# Patient Record
Sex: FEMALE | Race: AMERICAN INDIAN OR ALASKA NATIVE | NOT HISPANIC OR LATINO | Employment: PART TIME | URBAN - METROPOLITAN AREA
[De-identification: names, ages, dates, MRNs, and addresses within clinical notes are randomized per-mention and may not be internally consistent; named-entity substitution may affect disease eponyms.]

---

## 2017-06-02 ENCOUNTER — HOSPITAL ENCOUNTER (OUTPATIENT)
Dept: RADIOLOGY | Facility: MEDICAL CENTER | Age: 52
End: 2017-06-02
Attending: NURSE PRACTITIONER
Payer: COMMERCIAL

## 2017-06-02 DIAGNOSIS — Z12.31 ENCOUNTER FOR MAMMOGRAM TO ESTABLISH BASELINE MAMMOGRAM: ICD-10-CM

## 2017-06-02 PROCEDURE — 77063 BREAST TOMOSYNTHESIS BI: CPT

## 2018-01-16 ENCOUNTER — HOSPITAL ENCOUNTER (OUTPATIENT)
Dept: RADIOLOGY | Facility: MEDICAL CENTER | Age: 53
End: 2018-01-16
Attending: NURSE PRACTITIONER
Payer: COMMERCIAL

## 2018-01-16 DIAGNOSIS — R52 PAIN: ICD-10-CM

## 2018-01-16 PROCEDURE — 72100 X-RAY EXAM L-S SPINE 2/3 VWS: CPT

## 2018-04-24 ENCOUNTER — HOSPITAL ENCOUNTER (OUTPATIENT)
Dept: RADIOLOGY | Facility: MEDICAL CENTER | Age: 53
End: 2018-04-24
Attending: NURSE PRACTITIONER
Payer: COMMERCIAL

## 2018-04-24 DIAGNOSIS — M54.2 CERVICALGIA: ICD-10-CM

## 2018-04-24 PROCEDURE — 72050 X-RAY EXAM NECK SPINE 4/5VWS: CPT

## 2018-07-12 ENCOUNTER — OFFICE VISIT (OUTPATIENT)
Dept: URGENT CARE | Facility: CLINIC | Age: 53
End: 2018-07-12
Payer: COMMERCIAL

## 2018-07-12 VITALS
WEIGHT: 155 LBS | HEIGHT: 64 IN | SYSTOLIC BLOOD PRESSURE: 108 MMHG | RESPIRATION RATE: 16 BRPM | OXYGEN SATURATION: 94 % | DIASTOLIC BLOOD PRESSURE: 64 MMHG | BODY MASS INDEX: 26.46 KG/M2 | HEART RATE: 65 BPM | TEMPERATURE: 96.9 F

## 2018-07-12 DIAGNOSIS — H81.11 BENIGN PAROXYSMAL POSITIONAL VERTIGO OF RIGHT EAR: ICD-10-CM

## 2018-07-12 DIAGNOSIS — J01.80 OTHER ACUTE SINUSITIS, RECURRENCE NOT SPECIFIED: ICD-10-CM

## 2018-07-12 DIAGNOSIS — H69.91 DYSFUNCTION OF RIGHT EUSTACHIAN TUBE: ICD-10-CM

## 2018-07-12 PROCEDURE — 99203 OFFICE O/P NEW LOW 30 MIN: CPT | Performed by: FAMILY MEDICINE

## 2018-07-12 RX ORDER — AMOXICILLIN AND CLAVULANATE POTASSIUM 875; 125 MG/1; MG/1
1 TABLET, FILM COATED ORAL 2 TIMES DAILY
Qty: 20 TAB | Refills: 0 | Status: SHIPPED | OUTPATIENT
Start: 2018-07-12 | End: 2018-07-22

## 2018-07-12 RX ORDER — MECLIZINE HYDROCHLORIDE 25 MG/1
25 TABLET ORAL 3 TIMES DAILY PRN
Qty: 15 TAB | Refills: 0 | Status: SHIPPED | OUTPATIENT
Start: 2018-07-12 | End: 2020-03-18

## 2018-07-12 ASSESSMENT — ENCOUNTER SYMPTOMS
LOSS OF CONSCIOUSNESS: 0
SORE THROAT: 0
BLURRED VISION: 0
SEIZURES: 0
PALPITATIONS: 0
TINGLING: 0
DIZZINESS: 1
CHILLS: 0
SENSORY CHANGE: 0
TREMORS: 0
FEVER: 0
PHOTOPHOBIA: 0
DOUBLE VISION: 0
HEADACHES: 0
FOCAL WEAKNESS: 0
SPEECH CHANGE: 0

## 2018-07-12 ASSESSMENT — PAIN SCALES - GENERAL: PAINLEVEL: NO PAIN

## 2018-07-12 NOTE — LETTER
July 12, 2018         Patient: Chantel Adamson   YOB: 1965   Date of Visit: 7/12/2018           To Whom it May Concern:    Chantel Adamson was seen in my clinic on 7/12/2018. Please excuse patient from work due to illness 7/13-7/14/18.    If you have any questions or concerns, please don't hesitate to call.        Sincerely,           Kaur Hamilton D.O.  Electronically Signed

## 2018-07-13 NOTE — PROGRESS NOTES
Subjective:      Chantel Adamson is a 52 y.o. female who presents with Dizziness (x 3 days )  Patient presents to urgent care with acute onset of a new problem.  Over the past 3 days she has felt dizzy with positional movements.  Started on Monday morning when she got out of bed at 5 AM to go use the restroom she felt very dizzy.  She stayed in bed until 5 PM that day.  She states her symptoms have been mildly improving but not read yet resolved.  She denies any head injury.  [place].  She denies any ear pain no fevers or chills no nausea or vomiting no visual disturbances.  She denies any chest pain or shortness of breath.  She is otherwise healthy.  She denies any difficulty speaking or thought process.  She denies feeling lightheaded or faint.    HPI  Review of Systems   Constitutional: Negative for chills, fever and malaise/fatigue.   HENT: Negative for ear discharge, ear pain, hearing loss, sore throat and tinnitus.    Eyes: Negative for blurred vision, double vision and photophobia.   Cardiovascular: Negative for chest pain and palpitations.   Skin: Negative for itching and rash.   Neurological: Positive for dizziness. Negative for tingling, tremors, sensory change, speech change, focal weakness, seizures, loss of consciousness and headaches.     PMH:  has no past medical history on file.  MEDS:   Current Outpatient Prescriptions:   •  amoxicillin-clavulanate (AUGMENTIN) 875-125 MG Tab, Take 1 Tab by mouth 2 times a day for 10 days. With food, Disp: 20 Tab, Rfl: 0  •  meclizine (ANTIVERT) 25 MG Tab, Take 1 Tab by mouth 3 times a day as needed for Vertigo. May cause sedation, Disp: 15 Tab, Rfl: 0  ALLERGIES: Not on File  SURGHX: No past surgical history on file.  SOCHX:  reports that she has never smoked. She has never used smokeless tobacco. She reports that she drinks alcohol.  FH: Family history was reviewed, no pertinent findings to  "report     Objective:     /64   Pulse 65   Temp 36.1 °C (96.9 °F)   Resp 16   Ht 1.626 m (5' 4\")   Wt 70.3 kg (155 lb)   SpO2 94%   BMI 26.61 kg/m²      Physical Exam   Constitutional: She is oriented to person, place, and time. She appears well-developed and well-nourished. No distress.   HENT:   Right tm with erythema bulge and loss of landmarks  Left tm with mild fluid, mild maxillary sinus ttp   Eyes: Conjunctivae and EOM are normal. Pupils are equal, round, and reactive to light.   Neck: Normal range of motion.   Cardiovascular: Normal rate, regular rhythm, normal heart sounds and intact distal pulses.  Exam reveals no gallop and no friction rub.    No murmur heard.  Pulmonary/Chest: Effort normal and breath sounds normal. No respiratory distress. She has no wheezes. She has no rales. She exhibits no tenderness.   Musculoskeletal: Normal range of motion.   Neurological: She is alert and oriented to person, place, and time.   Nystagmus is present with fast component to the left dizzy symptoms are reproduced with looking left and right as well as head positional changes   Skin: Skin is warm and dry. She is not diaphoretic.   Psychiatric: She has a normal mood and affect. Her behavior is normal. Judgment and thought content normal.            Assessment/Plan:     1. Benign paroxysmal positional vertigo of right ear  meclizine (ANTIVERT) 25 MG Tab   2. Dysfunction of right eustachian tube  amoxicillin-clavulanate (AUGMENTIN) 875-125 MG Tab   3. Other acute sinusitis, recurrence not specified  amoxicillin-clavulanate (AUGMENTIN) 875-125 MG Tab     Differential diagnosis, natural history, supportive care discussed. Follow-up with primary care provider within 7-10 days, emergency room precautions discussed.  Patient and/or family appears understanding of information.    "

## 2020-01-02 ENCOUNTER — OFFICE VISIT (OUTPATIENT)
Dept: URGENT CARE | Facility: CLINIC | Age: 55
End: 2020-01-02
Payer: COMMERCIAL

## 2020-01-02 VITALS
OXYGEN SATURATION: 95 % | WEIGHT: 157 LBS | HEIGHT: 64 IN | DIASTOLIC BLOOD PRESSURE: 74 MMHG | SYSTOLIC BLOOD PRESSURE: 120 MMHG | RESPIRATION RATE: 14 BRPM | TEMPERATURE: 98.1 F | BODY MASS INDEX: 26.8 KG/M2 | HEART RATE: 64 BPM

## 2020-01-02 DIAGNOSIS — N30.00 ACUTE CYSTITIS WITHOUT HEMATURIA: ICD-10-CM

## 2020-01-02 LAB
APPEARANCE UR: CLEAR
BILIRUB UR STRIP-MCNC: NEGATIVE MG/DL
COLOR UR AUTO: YELLOW
GLUCOSE UR STRIP.AUTO-MCNC: NEGATIVE MG/DL
KETONES UR STRIP.AUTO-MCNC: NEGATIVE MG/DL
LEUKOCYTE ESTERASE UR QL STRIP.AUTO: NORMAL
NITRITE UR QL STRIP.AUTO: NEGATIVE
PH UR STRIP.AUTO: 6.5 [PH] (ref 5–8)
PROT UR QL STRIP: NEGATIVE MG/DL
RBC UR QL AUTO: NEGATIVE
SP GR UR STRIP.AUTO: 1.01
UROBILINOGEN UR STRIP-MCNC: 0.2 MG/DL

## 2020-01-02 PROCEDURE — 99214 OFFICE O/P EST MOD 30 MIN: CPT | Performed by: FAMILY MEDICINE

## 2020-01-02 PROCEDURE — 81002 URINALYSIS NONAUTO W/O SCOPE: CPT | Performed by: FAMILY MEDICINE

## 2020-01-02 RX ORDER — CIPROFLOXACIN 500 MG/1
500 TABLET, FILM COATED ORAL EVERY 12 HOURS
Qty: 14 TAB | Refills: 0 | Status: SHIPPED | OUTPATIENT
Start: 2020-01-02 | End: 2020-01-09

## 2020-01-02 NOTE — LETTER
January 2, 2020         Patient: Chantel Adamson   YOB: 1965   Date of Visit: 1/2/2020           To Whom it May Concern:    Chantel Adamson was seen in my clinic on 1/2/2020. She will return to work on 1/4/2020.    If you have any questions or concerns, please don't hesitate to call.        Sincerely,           Timmy Alcala M.D.  Electronically Signed

## 2020-01-03 NOTE — PROGRESS NOTES
"female who presents with DYSURIA, BACK PAIN            DYSURIA    This is a new problem. The current episode started in the past 2 days. The problem occurs constantly. The problem has been unchanged. Associated symptoms include increased urinary frequency and bilat flank pain. Pertinent negatives include no change in bowel habit, chest pain, chills, visual change, vomiting or weakness. Nothing aggravates the symptoms. She has tried nothing for the symptoms.               Social History     Tobacco Use   • Smoking status: Never Smoker   • Smokeless tobacco: Never Used   Substance Use Topics   • Alcohol use: Yes   • Drug use: Not on file       Past medical history was unremarkable and not pertinent to current issue    Current Outpatient Medications on File Prior to Visit   Medication Sig Dispense Refill   • meclizine (ANTIVERT) 25 MG Tab Take 1 Tab by mouth 3 times a day as needed for Vertigo. May cause sedation 15 Tab 0     No current facility-administered medications on file prior to visit.        Review of Systems   Constitutional: Negative for fevers or chills.   HENT: negative for cough, congestion, sore throat  Cardiovascular: Negative for chest pain.   Gastrointestinal: Positive for nausea. Negative for vomiting and change in bowel habit.   Neurological: Negative for weakness, dizziness.          Objective:   /74 (BP Location: Left arm, Patient Position: Sitting, BP Cuff Size: Adult)   Pulse 64   Temp 36.7 °C (98.1 °F) (Temporal)   Resp 14   Ht 1.626 m (5' 4\")   Wt 71.2 kg (157 lb)   SpO2 95%       Physical Exam  HEENT - PERRLA, EOMI  Neuro - alert and oriented x3. CN 2-12 grossly intact.  Lungs - CTA. No wheezes, rhonchi or rales.  Heart - regular rate and rhythm without murmur.  Abdomen - soft and non-tender, bowel sounds active x4.   + bilat flank  pain  Musculoskeletal - No lower extremity edema noted.         No results found for: POCCOLOR, POCAPPEAR, POCLEUKEST, POCNITRITE, POCUROBILIGE, " POCPROTEIN, POCURPH, POCBLOOD, POCSPGRV, POCKETONES, POCBILIRUBIN, POCGLUCUA        Assessment/Plan:     1. Acute cystitis without hematuria  UA c/w infection    - ciprofloxacin (CIPRO) 500 MG Tab; Take 1 Tab by mouth every 12 hours for 7 days.  Dispense: 14 Tab; Refill: 0    Follow up in one week if no improvement, sooner if symptoms worsen.

## 2020-01-15 ENCOUNTER — HOSPITAL ENCOUNTER (EMERGENCY)
Facility: MEDICAL CENTER | Age: 55
End: 2020-01-15
Attending: EMERGENCY MEDICINE
Payer: COMMERCIAL

## 2020-01-15 ENCOUNTER — APPOINTMENT (OUTPATIENT)
Dept: RADIOLOGY | Facility: MEDICAL CENTER | Age: 55
End: 2020-01-15
Attending: EMERGENCY MEDICINE
Payer: COMMERCIAL

## 2020-01-15 VITALS
OXYGEN SATURATION: 94 % | DIASTOLIC BLOOD PRESSURE: 81 MMHG | RESPIRATION RATE: 16 BRPM | SYSTOLIC BLOOD PRESSURE: 133 MMHG | BODY MASS INDEX: 26.84 KG/M2 | HEIGHT: 64 IN | WEIGHT: 157.19 LBS | TEMPERATURE: 97.5 F | HEART RATE: 69 BPM

## 2020-01-15 DIAGNOSIS — R51.9 ACUTE NONINTRACTABLE HEADACHE, UNSPECIFIED HEADACHE TYPE: ICD-10-CM

## 2020-01-15 LAB
FLUAV RNA SPEC QL NAA+PROBE: NEGATIVE
FLUBV RNA SPEC QL NAA+PROBE: NEGATIVE

## 2020-01-15 PROCEDURE — 99283 EMERGENCY DEPT VISIT LOW MDM: CPT

## 2020-01-15 PROCEDURE — 700111 HCHG RX REV CODE 636 W/ 250 OVERRIDE (IP): Performed by: EMERGENCY MEDICINE

## 2020-01-15 PROCEDURE — 70450 CT HEAD/BRAIN W/O DYE: CPT

## 2020-01-15 PROCEDURE — 96372 THER/PROPH/DIAG INJ SC/IM: CPT

## 2020-01-15 PROCEDURE — 87502 INFLUENZA DNA AMP PROBE: CPT

## 2020-01-15 RX ORDER — BUTALBITAL, ACETAMINOPHEN AND CAFFEINE 50; 325; 40 MG/1; MG/1; MG/1
1 TABLET ORAL EVERY 4 HOURS PRN
Qty: 10 TAB | Refills: 0 | Status: SHIPPED | OUTPATIENT
Start: 2020-01-15 | End: 2020-01-18

## 2020-01-15 RX ORDER — CHLORAL HYDRATE 500 MG
1000 CAPSULE ORAL
COMMUNITY

## 2020-01-15 RX ORDER — DIAZEPAM 5 MG/ML
5 INJECTION, SOLUTION INTRAMUSCULAR; INTRAVENOUS ONCE
Status: COMPLETED | OUTPATIENT
Start: 2020-01-15 | End: 2020-01-15

## 2020-01-15 RX ORDER — IBUPROFEN 600 MG/1
600 TABLET ORAL EVERY 6 HOURS PRN
Qty: 20 TAB | Refills: 0 | Status: SHIPPED | OUTPATIENT
Start: 2020-01-15 | End: 2024-02-16

## 2020-01-15 RX ORDER — KETOROLAC TROMETHAMINE 30 MG/ML
30 INJECTION, SOLUTION INTRAMUSCULAR; INTRAVENOUS ONCE
Status: COMPLETED | OUTPATIENT
Start: 2020-01-15 | End: 2020-01-15

## 2020-01-15 RX ORDER — DIAZEPAM 5 MG/1
5 TABLET ORAL EVERY 6 HOURS PRN
Qty: 10 TAB | Refills: 0 | Status: SHIPPED | OUTPATIENT
Start: 2020-01-15 | End: 2020-01-18

## 2020-01-15 RX ADMIN — KETOROLAC TROMETHAMINE 30 MG: 30 INJECTION, SOLUTION INTRAMUSCULAR at 09:15

## 2020-01-15 RX ADMIN — DIAZEPAM 5 MG: 5 INJECTION, SOLUTION INTRAMUSCULAR; INTRAVENOUS at 09:15

## 2020-01-15 NOTE — ED TRIAGE NOTES
"Chief Complaint   Patient presents with   • Headache     Pt c/o pain posterior neck radiating up to the back of her head. Pain started on Saturday with \"pressure\" in her head. Denies blurred vision, fevers, or elevated BP.      /81   Pulse 69   Temp 36.4 °C (97.5 °F) (Temporal)   Resp 16   Ht 1.626 m (5' 4\")   Wt 71.3 kg (157 lb 3 oz)   SpO2 94%   BMI 26.98 kg/m²   Pt placed back in lobby, educated on triage process, and told to inform staff of any change in condition.     "

## 2020-01-15 NOTE — ED PROVIDER NOTES
"ED Provider Note    CHIEF COMPLAINT  Chief Complaint   Patient presents with   • Headache     Pt c/o pain posterior neck radiating up to the back of her head. Pain started on Saturday with \"pressure\" in her head. Denies blurred vision, fevers, or elevated BP.        HPI  Chantel Adamson is a 54 y.o. female who presents to the emergency department through triage for headache.  Patient describes 4 to 5 days of headache, posterior neck, posterior head, radiating to the bitemporal regions.  Constant ache, intermittent throbbing, this has kept her awake.  Patient describes chronic low back pain and \"tension\" in her neck but has not had headaches associated with the symptoms prior.  Headache was not maximal nor thunderclap in onset, is not the worst headache of her life.  No visual changes, facial droop, slurred speech, focal weakness or paresthesias.  No syncope.  No fever or chills.  No nausea or vomiting.  Decreased appetite but tolerating food and fluids.  Patient also describes general malaise and fatigue.  No URI symptoms.    REVIEW OF SYSTEMS  See HPI for further details. All other systems are negative.     PAST MEDICAL HISTORY   Denies    SOCIAL HISTORY  Social History     Tobacco Use   • Smoking status: Never Smoker   • Smokeless tobacco: Never Used   Substance and Sexual Activity   • Alcohol use: Yes   • Drug use: Not on file   • Sexual activity: Not on file       SURGICAL HISTORY  patient denies any surgical history    CURRENT MEDICATIONS  Home Medications     Reviewed by Madina Patrick R.N. (Registered Nurse) on 01/15/20 at 0751  Med List Status: Complete   Medication Last Dose Status   meclizine (ANTIVERT) 25 MG Tab  Active   Multiple Vitamins-Minerals (CENTRUM ADULTS PO)  Active   Omega-3 Fatty Acids (FISH OIL) 1000 MG Cap capsule  Active                ALLERGIES  No Known Allergies    PHYSICAL EXAM  VITAL SIGNS: /81   Pulse 69   Temp 36.4 °C (97.5 °F) (Temporal)   Resp 16   Ht 1.626 m (5' 4\")  "  Wt 71.3 kg (157 lb 3 oz)   SpO2 94%   BMI 26.98 kg/m²   Pulse ox interpretation: I interpret this pulse ox as normal.  Constitutional: Alert in no apparent distress.  HENT: Normocephalic, atraumatic. Bilateral external ears normal, Nose normal. Moist mucous membranes.    Eyes: Pupils are equal and reactive, Conjunctiva normal.  EOMI.  No nystagmus.  Neck: Normal range of motion, Supple.  Some mild reproducible discomfort in the bilateral cervical paraspinal musculatures radiating across the trapezius muscles.  No meningeal irritation.  Lymphatic: No lymphadenopathy noted.   Cardiovascular: Regular rate and rhythm, no murmurs. Distal pulses intact.    Thorax & Lungs: Normal breath sounds.  No wheezing/rales/ronchi. No increased work of breathing  Skin: Warm, Dry  Musculoskeletal: Good range of motion in all major joints.   Neurologic: Alert and oriented x4.  Speech clear and cohesive.  Cranial nerves II through XII intact bilaterally.  No facial droop.  5 out of 5 strength in 4 extremities with proximal and distal muscle groups.  Straight leg raise intact bilaterally.  2+ patellar DTR bilaterally.  No pronator drift.  Gait stable independently without ataxia.  Psychiatric: Affect normal, Judgment normal, Mood normal.       DIAGNOSTIC STUDIES / PROCEDURES  LABS  Results for orders placed or performed during the hospital encounter of 01/15/20   Influenza A/B By PCR (Adult - Flu Only)   Result Value Ref Range    Influenza virus A RNA Negative Negative    Influenza virus B, PCR Negative Negative       RADIOLOGY  CT-HEAD W/O   Final Result      No acute intracranial abnormality          COURSE & MEDICAL DECISION MAKING  Evaluation for headache is unrevealing.  Patient is neurologically intact and nonfocal.  No history of clinical evidence to suggest subarachnoid hemorrhage, stroke or other mass-effect.  No further evidence for meningitis. l CT head is within normal limits.  Influenza negative.  Patient does describe  some history of tension type neck pain, suspect this may be related to that.  Symptomatology much improved with Toradol and Valium.  Hemodynamically stable.  Blood pressures well controlled.  Ambulates independently.    Patient is stable for discharge at this time, anticipatory guidance provided, Motrin and Valium as needed for pain or spasm, Fioricet for breakthrough headache, close follow-up is encouraged, and strict ED return instructions have been detailed. Patient is agreeable to the disposition and plan.    Patient's blood pressure was elevated in the emergency department, and has been referred to primary care for close monitoring.    FINAL IMPRESSION  (R51) Acute nonintractable headache, unspecified headache type      Electronically signed by: Flower Colindres D.O., 1/15/2020 11:24 AM      This dictation was created using voice recognition software. The accuracy of the dictation is limited to the abilities of the software. I expect there may be some errors of grammar and possibly content. The nursing notes were reviewed and certain aspects of this information were incorporated into this note.

## 2020-01-15 NOTE — ED NOTES
Patient verbalized understanding of discharge instructions, patient also stated compliance with prescribed mediations

## 2020-01-15 NOTE — ED NOTES
Patient states pain in neck and upper back that is radiating up to her head, she states her pain as a 4 on a 0 to 10 scale.

## 2020-01-15 NOTE — DISCHARGE INSTRUCTIONS
Follow-up with primary care this week for reevaluation, medication management close blood pressure monitoring.    Continue any home medications as previously indicated.  Add Motrin and Valium every 6 hours as needed for headache, neck pain or stiffness.  Fioricet every 6 hours as needed for persistent or breakthrough headache.    Slow stretching and range of motion exercises recommended.  Activity as tolerated.  Encourage oral fluid hydration.    Return the emergency department for persistent worsening headache, altered mental status, focal weakness, slurred speech, seizure, fever or other new concerns.

## 2020-03-18 ENCOUNTER — HOSPITAL ENCOUNTER (EMERGENCY)
Facility: MEDICAL CENTER | Age: 55
End: 2020-03-18
Attending: EMERGENCY MEDICINE
Payer: COMMERCIAL

## 2020-03-18 VITALS
BODY MASS INDEX: 27.4 KG/M2 | TEMPERATURE: 97.9 F | WEIGHT: 160.5 LBS | DIASTOLIC BLOOD PRESSURE: 69 MMHG | RESPIRATION RATE: 18 BRPM | SYSTOLIC BLOOD PRESSURE: 118 MMHG | HEART RATE: 67 BPM | HEIGHT: 64 IN | OXYGEN SATURATION: 97 %

## 2020-03-18 DIAGNOSIS — F41.1 ANXIETY REACTION: ICD-10-CM

## 2020-03-18 DIAGNOSIS — R25.1 SHAKING: ICD-10-CM

## 2020-03-18 PROCEDURE — 99283 EMERGENCY DEPT VISIT LOW MDM: CPT

## 2020-03-19 NOTE — ED TRIAGE NOTES
"Chantel Adamson   54 y.o. female   Chief Complaint   Patient presents with   • Shaking     \"i felt off this morning, went to work, came home and made dinner, sat down around 2000 and started feeling shaky, not myself.\" no shaking noticed at all in triage, denies any alcohol use or history of      Pt amb to triage with steady gait for above complaint.      Pt is alert and oriented, speaking in full sentences, follows commands and responds appropriately to questions. NAD. Resp are even and unlabored.   Pt placed in lobby. Pt educated on triage process. Pt encouraged to alert staff for any changes.    /84   Pulse 89   Temp 36.6 °C (97.9 °F)   Resp 18   Ht 1.626 m (5' 4\")   Wt 72.8 kg (160 lb 7.9 oz)   SpO2 94%   BMI 27.55 kg/m²     "

## 2020-03-19 NOTE — ED TRIAGE NOTES
Assessment made. Chart up for MD to see. C/o shaking started around 8 pm. No shaking at this time.

## 2020-03-19 NOTE — ED PROVIDER NOTES
"ED Provider Note    ED Provider Note      Primary care provider: WERNER White    CHIEF COMPLAINT  Chief Complaint   Patient presents with   • Shaking     \"i felt off this morning, went to work, came home and made dinner, sat down around 2000 and started feeling shaky, not myself.\" no shaking noticed at all in triage, denies any alcohol use or history of       HPI   Chantel Adamson is a 54 y.o. female who presents to the Emergency Department with chief complaint of shaking.  Patient reported that she was fixing her dinner this evening approximately 730 when she began feeling somewhat shaky.  She stated that following the shaking and she felt very weak felt numb in her feet was having a difficult time walking.  No confusion no altered mental status she also felt as though she was having a difficult time swallowing during that time and that her heart was racing.  No fevers no chills no cough no myalgias no exposure to sick contacts no recent travel.  Patient reports that at this time her symptoms have completely resolved and she feels back to her baseline.  She reports 1 previous similar episode several years prior.   reports that he does think this was anxiety related at that time he states that the doctor gave her medicine to calm her down.  Patient does not report increased stress at this time is been sleeping well she feels though she had a well and was hydrated throughout the day.        REVIEW OF SYSTEMS  10 systems reviewed and otherwise negative, pertinent positives and negatives listed in the history of present illness.      PAST MEDICAL HISTORY     None  SURGICAL HISTORY   has a past surgical history that includes primary c section.    SOCIAL HISTORY  Social History     Tobacco Use   • Smoking status: Never Smoker   • Smokeless tobacco: Never Used   Substance Use Topics   • Alcohol use: Yes   • Drug use: Never      Social History     Substance and Sexual Activity   Drug Use Never " "      FAMILY HISTORY  Non-Contributory    CURRENT MEDICATIONS  Home Medications     Reviewed by Reynaldo Luna R.N. (Registered Nurse) on 03/18/20 at 2049  Med List Status: Not Addressed   Medication Last Dose Status   ibuprofen (MOTRIN) 600 MG Tab  Active   Multiple Vitamins-Minerals (CENTRUM ADULTS PO)  Active   Omega-3 Fatty Acids (FISH OIL) 1000 MG Cap capsule  Active                ALLERGIES  No Known Allergies    PHYSICAL EXAM  VITAL SIGNS: /70   Pulse 67   Temp 36.6 °C (97.9 °F)   Resp 18   Ht 1.626 m (5' 4\")   Wt 72.8 kg (160 lb 7.9 oz)   SpO2 97%   BMI 27.55 kg/m²   Pulse ox interpretation: I interpret this pulse ox as normal.  Constitutional: Alert and oriented x 3, no acute distress  HEENT: Atraumatic normocephalic, pupils are equal round reactive to light extraocular movements are intact. The nares is clear, external ears are normal, mouth shows moist mucous membranes  Neck: Supple, no JVD no tracheal deviation  Cardiovascular: Regular rate and rhythm no murmur rub or gallop 2+ pulses peripherally x4  Thorax & Lungs: No respiratory distress, no wheezes rales or rhonchi, No chest tenderness.   GI: Soft nontender nondistended positive bowel sounds, no peritoneal signs  Skin: Warm dry no acute rash or lesion  Musculoskeletal: Moving all extremities with full range and 5 of 5 strength, no acute  deformity  Neurologic: Cranial nerves III through XII are grossly intact, no sensory deficit, no cerebellar dysfunction   Psychiatric: Appropriate affect for situation at this time      DIAGNOSTIC STUDIES / PROCEDURES        COURSE & MEDICAL DECISION MAKING  Pertinent Labs & Imaging studies reviewed. (See chart for details)    9:21 PM - Patient seen and examined at bedside.         Patient noted to have slightly elevated blood pressure likely circumstantial secondary to presenting complaint. Referred to primary care physician for further evaluation.      Medical Decision Making: Patient is " "asymptomatic upon my evaluation and has completely unremarkable vital signs.  Discussed with at length with patient that we could do multiple labs as well as imaging and though I felt that those they were be low yield with her short duration of her symptoms and her complete return to baseline.  From the description of her symptoms I do suspect that this was likely anxiety related/panic attack.  Given the current situation in the hospital I discussed at length with the patient that I did not feel as though it is appropriate to keep her in the hospital for any evaluation and treatment to avoid any further exposure patient understands that should she have any return of her symptoms any further weakness shakiness chest pain palpitation shortness of breath altered mental status any other acute symptoms or concerns to return immediately otherwise she is discharged in stable condition.    /70   Pulse 67   Temp 36.6 °C (97.9 °F)   Resp 18   Ht 1.626 m (5' 4\")   Wt 72.8 kg (160 lb 7.9 oz)   SpO2 97%   BMI 27.55 kg/m²     Pham Piedra, A.P.R.N.  [address]  [facility] [id number] [phone]    In 2 days  if symptoms persist    [facility], Emergency Dept  [address] [address] [phone]    immediately if symptoms worsen      Discharge Medication List as of 3/18/2020  9:34 PM          FINAL IMPRESSION  1. Shaking Inactive   2. Anxiety reaction Active          This dictation has been created using voice recognition software and/or scribes. The accuracy of the dictation is limited by the abilities of the software and the expertise of the scribes. I expect there may be some errors of grammar and possibly content. I made every attempt to manually correct the errors within my dictation. However, errors related to voice recognition software and/or scribes may still exist and should be interpreted within the appropriate context.            "

## 2020-06-12 ENCOUNTER — TELEPHONE (OUTPATIENT)
Dept: HEALTH INFORMATION MANAGEMENT | Facility: OTHER | Age: 55
End: 2020-06-12

## 2020-06-12 NOTE — TELEPHONE ENCOUNTER
1. Caller Name: Chantel Adamson                Call Back Number: [phone]  [facility] PCP or Specialty Provider: Yes         2.  In the last two weeks, has the patient had any new or worsening symptoms (not explained by alternative diagnosis)? No.    3.  Does patient have any comoribidities? None     4.  Has the patient traveled in the last 14 days OR had any known contact with someone who is suspected or confirmed to have COVID-19?  Yes, possible exposure.     5. POTENTIAL PUI (LOW): Advised to perform self care, monitor for worsening symptoms and to call back in 3 days if no improvement. Instructed to self isolate and contact [facility] at [phone]         Note routed to [facility] Provider: [facility] only.

## 2021-02-18 ENCOUNTER — APPOINTMENT (OUTPATIENT)
Dept: RADIOLOGY | Facility: MEDICAL CENTER | Age: 56
End: 2021-02-18
Attending: STUDENT IN AN ORGANIZED HEALTH CARE EDUCATION/TRAINING PROGRAM
Payer: COMMERCIAL

## 2021-03-03 ENCOUNTER — HOSPITAL ENCOUNTER (OUTPATIENT)
Dept: LAB | Facility: MEDICAL CENTER | Age: 56
End: 2021-03-03
Attending: STUDENT IN AN ORGANIZED HEALTH CARE EDUCATION/TRAINING PROGRAM
Payer: COMMERCIAL

## 2021-03-03 LAB
ALBUMIN SERPL BCP-MCNC: 4.2 G/DL (ref 3.2–4.9)
ALBUMIN/GLOB SERPL: 1.3 G/DL
ALP SERPL-CCNC: 100 U/L (ref 30–99)
ALT SERPL-CCNC: 18 U/L (ref 2–50)
ANION GAP SERPL CALC-SCNC: 9 MMOL/L (ref 7–16)
AST SERPL-CCNC: 22 U/L (ref 12–45)
BASOPHILS # BLD AUTO: 0.7 % (ref 0–1.8)
BASOPHILS # BLD: 0.05 K/UL (ref 0–0.12)
BILIRUB SERPL-MCNC: 0.3 MG/DL (ref 0.1–1.5)
BUN SERPL-MCNC: 18 MG/DL (ref 8–22)
CALCIUM SERPL-MCNC: 9.9 MG/DL (ref 8.5–10.5)
CHLORIDE SERPL-SCNC: 104 MMOL/L (ref 96–112)
CHOLEST SERPL-MCNC: 186 MG/DL (ref 100–199)
CO2 SERPL-SCNC: 27 MMOL/L (ref 20–33)
CREAT SERPL-MCNC: 0.6 MG/DL (ref 0.5–1.4)
EOSINOPHIL # BLD AUTO: 0.39 K/UL (ref 0–0.51)
EOSINOPHIL NFR BLD: 5.6 % (ref 0–6.9)
ERYTHROCYTE [DISTWIDTH] IN BLOOD BY AUTOMATED COUNT: 43.4 FL (ref 35.9–50)
GLOBULIN SER CALC-MCNC: 3.2 G/DL (ref 1.9–3.5)
GLUCOSE SERPL-MCNC: 84 MG/DL (ref 65–99)
HCT VFR BLD AUTO: 40.8 % (ref 37–47)
HDLC SERPL-MCNC: 56 MG/DL
HGB BLD-MCNC: 12.8 G/DL (ref 12–16)
IMM GRANULOCYTES # BLD AUTO: 0.02 K/UL (ref 0–0.11)
IMM GRANULOCYTES NFR BLD AUTO: 0.3 % (ref 0–0.9)
LDLC SERPL CALC-MCNC: 107 MG/DL
LYMPHOCYTES # BLD AUTO: 1.8 K/UL (ref 1–4.8)
LYMPHOCYTES NFR BLD: 25.8 % (ref 22–41)
MCH RBC QN AUTO: 27.5 PG (ref 27–33)
MCHC RBC AUTO-ENTMCNC: 31.4 G/DL (ref 33.6–35)
MCV RBC AUTO: 87.6 FL (ref 81.4–97.8)
MONOCYTES # BLD AUTO: 0.5 K/UL (ref 0–0.85)
MONOCYTES NFR BLD AUTO: 7.2 % (ref 0–13.4)
NEUTROPHILS # BLD AUTO: 4.22 K/UL (ref 2–7.15)
NEUTROPHILS NFR BLD: 60.4 % (ref 44–72)
NRBC # BLD AUTO: 0 K/UL
NRBC BLD-RTO: 0 /100 WBC
PLATELET # BLD AUTO: 285 K/UL (ref 164–446)
PMV BLD AUTO: 10.9 FL (ref 9–12.9)
POTASSIUM SERPL-SCNC: 4.2 MMOL/L (ref 3.6–5.5)
PROT SERPL-MCNC: 7.4 G/DL (ref 6–8.2)
RBC # BLD AUTO: 4.66 M/UL (ref 4.2–5.4)
SARS-COV-2 AB SERPL QL IA: REACTIVE
SODIUM SERPL-SCNC: 140 MMOL/L (ref 135–145)
TRIGL SERPL-MCNC: 114 MG/DL (ref 0–149)
TSH SERPL DL<=0.005 MIU/L-ACNC: 0.97 UIU/ML (ref 0.38–5.33)
WBC # BLD AUTO: 7 K/UL (ref 4.8–10.8)

## 2021-03-03 PROCEDURE — 80061 LIPID PANEL: CPT

## 2021-03-03 PROCEDURE — 86769 SARS-COV-2 COVID-19 ANTIBODY: CPT

## 2021-03-03 PROCEDURE — 80053 COMPREHEN METABOLIC PANEL: CPT

## 2021-03-03 PROCEDURE — 84443 ASSAY THYROID STIM HORMONE: CPT

## 2021-03-03 PROCEDURE — 36415 COLL VENOUS BLD VENIPUNCTURE: CPT

## 2021-03-03 PROCEDURE — 85025 COMPLETE CBC W/AUTO DIFF WBC: CPT

## 2021-03-15 DIAGNOSIS — Z23 NEED FOR VACCINATION: ICD-10-CM

## 2021-04-14 ENCOUNTER — HOSPITAL ENCOUNTER (OUTPATIENT)
Dept: RADIOLOGY | Facility: MEDICAL CENTER | Age: 56
End: 2021-04-14
Attending: STUDENT IN AN ORGANIZED HEALTH CARE EDUCATION/TRAINING PROGRAM
Payer: COMMERCIAL

## 2021-04-14 DIAGNOSIS — M62.838 OTHER MUSCLE SPASM: ICD-10-CM

## 2021-04-14 PROCEDURE — 72040 X-RAY EXAM NECK SPINE 2-3 VW: CPT

## 2021-09-29 ENCOUNTER — HOSPITAL ENCOUNTER (OUTPATIENT)
Dept: LAB | Facility: MEDICAL CENTER | Age: 56
End: 2021-09-29
Attending: PHYSICIAN ASSISTANT
Payer: COMMERCIAL

## 2021-09-29 PROCEDURE — 88175 CYTOPATH C/V AUTO FLUID REDO: CPT

## 2021-09-29 PROCEDURE — 87624 HPV HI-RISK TYP POOLED RSLT: CPT

## 2021-09-30 LAB
CYTOLOGY REG CYTOL: NORMAL
HPV HR 12 DNA CVX QL NAA+PROBE: NEGATIVE
HPV16 DNA SPEC QL NAA+PROBE: NEGATIVE
HPV18 DNA SPEC QL NAA+PROBE: NEGATIVE
SPECIMEN SOURCE: NORMAL

## 2021-12-06 ENCOUNTER — HOSPITAL ENCOUNTER (OUTPATIENT)
Dept: RADIOLOGY | Facility: MEDICAL CENTER | Age: 56
End: 2021-12-06
Attending: STUDENT IN AN ORGANIZED HEALTH CARE EDUCATION/TRAINING PROGRAM
Payer: COMMERCIAL

## 2021-12-06 DIAGNOSIS — Z12.31 VISIT FOR SCREENING MAMMOGRAM: ICD-10-CM

## 2021-12-06 PROCEDURE — 77063 BREAST TOMOSYNTHESIS BI: CPT

## 2022-12-30 ENCOUNTER — HOSPITAL ENCOUNTER (OUTPATIENT)
Dept: RADIOLOGY | Facility: MEDICAL CENTER | Age: 57
End: 2022-12-30
Attending: STUDENT IN AN ORGANIZED HEALTH CARE EDUCATION/TRAINING PROGRAM
Payer: COMMERCIAL

## 2022-12-30 DIAGNOSIS — Z12.31 VISIT FOR SCREENING MAMMOGRAM: ICD-10-CM

## 2022-12-30 PROCEDURE — 77063 BREAST TOMOSYNTHESIS BI: CPT

## 2024-05-03 ENCOUNTER — HOSPITAL ENCOUNTER (OUTPATIENT)
Dept: RADIOLOGY | Facility: MEDICAL CENTER | Age: 59
End: 2024-05-03
Attending: STUDENT IN AN ORGANIZED HEALTH CARE EDUCATION/TRAINING PROGRAM
Payer: COMMERCIAL

## 2024-05-03 DIAGNOSIS — Z12.31 VISIT FOR SCREENING MAMMOGRAM: ICD-10-CM

## 2025-04-11 ENCOUNTER — OFFICE VISIT (OUTPATIENT)
Dept: URGENT CARE | Facility: CLINIC | Age: 60
End: 2025-04-11
Payer: COMMERCIAL

## 2025-04-11 VITALS
HEIGHT: 64 IN | BODY MASS INDEX: 28.75 KG/M2 | DIASTOLIC BLOOD PRESSURE: 70 MMHG | WEIGHT: 168.4 LBS | OXYGEN SATURATION: 100 % | SYSTOLIC BLOOD PRESSURE: 110 MMHG | RESPIRATION RATE: 15 BRPM | HEART RATE: 76 BPM | TEMPERATURE: 97.2 F

## 2025-04-11 DIAGNOSIS — H65.192 OTHER NON-RECURRENT ACUTE NONSUPPURATIVE OTITIS MEDIA OF LEFT EAR: ICD-10-CM

## 2025-04-11 DIAGNOSIS — J01.01 ACUTE RECURRENT MAXILLARY SINUSITIS: ICD-10-CM

## 2025-04-11 DIAGNOSIS — J30.1 SEASONAL ALLERGIC RHINITIS DUE TO POLLEN: ICD-10-CM

## 2025-04-11 DIAGNOSIS — J01.11 ACUTE RECURRENT FRONTAL SINUSITIS: ICD-10-CM

## 2025-04-11 PROCEDURE — 3074F SYST BP LT 130 MM HG: CPT

## 2025-04-11 PROCEDURE — 96372 THER/PROPH/DIAG INJ SC/IM: CPT

## 2025-04-11 PROCEDURE — 99213 OFFICE O/P EST LOW 20 MIN: CPT | Mod: 25

## 2025-04-11 RX ORDER — AMOXICILLIN 500 MG/1
500 CAPSULE ORAL 2 TIMES DAILY
Qty: 20 CAPSULE | Refills: 0 | Status: SHIPPED | OUTPATIENT
Start: 2025-04-11 | End: 2025-04-21

## 2025-04-11 RX ORDER — FLUTICASONE PROPIONATE 50 MCG
2 SPRAY, SUSPENSION (ML) NASAL DAILY
Qty: 9.9 ML | Refills: 0 | Status: SHIPPED | OUTPATIENT
Start: 2025-04-11

## 2025-04-11 RX ORDER — TRIAMCINOLONE ACETONIDE 40 MG/ML
40 INJECTION, SUSPENSION INTRA-ARTICULAR; INTRAMUSCULAR ONCE
Status: COMPLETED | OUTPATIENT
Start: 2025-04-11 | End: 2025-04-11

## 2025-04-11 RX ADMIN — TRIAMCINOLONE ACETONIDE 40 MG: 40 INJECTION, SUSPENSION INTRA-ARTICULAR; INTRAMUSCULAR at 08:48

## 2025-04-11 ASSESSMENT — ENCOUNTER SYMPTOMS
CHILLS: 1
COUGH: 1
HEADACHES: 1
SINUS PAIN: 1
SHORTNESS OF BREATH: 0

## 2025-04-11 NOTE — PROGRESS NOTES
Subjective:   Chief Complaint  Chantel Adamson is a 59 y.o. female who presents for Seasonal Allergies (Runny nose, states she feels sinus pressure on the left side)      History of Present Illness  Patient presents with complaints of maxillary and frontal sinus pressure and congestion for the last week.  She also reports body aches and chills.  Denies fever she states this time every year she develops bad seasonal allergies and requires a Kenalog injection for her allergens from her PCP but they moved to another area and she has not been out of follow-up with them.  She says she tried taking an OTC nasal spray last night which helped her symptoms but that came back in the morning.        Review of Systems  Review of Systems   Constitutional:  Positive for chills and malaise/fatigue.   HENT:  Positive for congestion and sinus pain.    Respiratory:  Positive for cough. Negative for shortness of breath.    Cardiovascular:  Negative for chest pain.   Neurological:  Positive for headaches.       Past Medical History  History reviewed. No pertinent past medical history.    Family History  History reviewed. No pertinent family history.    Social History  Social History     Socioeconomic History    Marital status:    Tobacco Use    Smoking status: Never    Smokeless tobacco: Never   Vaping Use    Vaping status: Never Used   Substance and Sexual Activity    Alcohol use: Yes    Drug use: Never       Surgical History  Past Surgical History:   Procedure Laterality Date    PRIMARY C SECTION      x 3       Current Medications  Home Medications       Reviewed by Charly Zarco on 04/11/25 at 0825  Med List Status: <None>     Medication Last Dose Status   Multiple Vitamins-Minerals (CENTRUM ADULTS PO)  Active   Omega-3 Fatty Acids (FISH OIL) 1000 MG Cap capsule  Active                    Allergies  No Known Allergies       Objective:     /70 (BP Location: Left arm, Patient Position: Sitting, BP Cuff Size: Adult)  "  Pulse 76   Temp 36.2 °C (97.2 °F) (Temporal)   Resp 15   Ht 1.626 m (5' 4\")   Wt 76.4 kg (168 lb 6.4 oz)   SpO2 100%     Physical Exam  Constitutional:       Appearance: Normal appearance.   HENT:      Head: Normocephalic and atraumatic.      Right Ear: Tympanic membrane, ear canal and external ear normal.      Left Ear: Tympanic membrane, ear canal and external ear normal.      Nose: Congestion and rhinorrhea present.      Right Turbinates: Enlarged.      Left Turbinates: Enlarged.      Right Sinus: Maxillary sinus tenderness and frontal sinus tenderness present.      Left Sinus: Maxillary sinus tenderness and frontal sinus tenderness present.      Comments: Purulent drainage noted in sinus cavities     Mouth/Throat:      Mouth: Mucous membranes are moist.      Pharynx: Oropharynx is clear. Posterior oropharyngeal erythema present.   Eyes:      Conjunctiva/sclera: Conjunctivae normal.      Pupils: Pupils are equal, round, and reactive to light.   Cardiovascular:      Rate and Rhythm: Normal rate and regular rhythm.      Pulses: Normal pulses.      Heart sounds: Normal heart sounds.   Pulmonary:      Effort: Pulmonary effort is normal.      Breath sounds: Normal breath sounds.   Abdominal:      General: Bowel sounds are normal.   Skin:     General: Skin is warm and dry.      Capillary Refill: Capillary refill takes less than 2 seconds.   Neurological:      General: No focal deficit present.      Mental Status: She is alert and oriented to person, place, and time.   Psychiatric:         Mood and Affect: Mood normal.         Behavior: Behavior normal.         Assessment/Plan:     Diagnosis  1. Other non-recurrent acute nonsuppurative otitis media of left ear  - amoxicillin (AMOXIL) 500 MG Cap; Take 1 Capsule by mouth 2 times a day for 10 days.  Dispense: 20 Capsule; Refill: 0    2. Acute recurrent maxillary sinusitis  - triamcinolone acetonide (Kenalog-40) injection 40 mg  - fluticasone (FLONASE) 50 MCG/ACT " nasal spray; Administer 2 Sprays into affected nostril(S) every day.  Dispense: 9.9 mL; Refill: 0    3. Acute recurrent frontal sinusitis  - triamcinolone acetonide (Kenalog-40) injection 40 mg  - fluticasone (FLONASE) 50 MCG/ACT nasal spray; Administer 2 Sprays into affected nostril(S) every day.  Dispense: 9.9 mL; Refill: 0    4. Seasonal allergic rhinitis due to pollen  - triamcinolone acetonide (Kenalog-40) injection 40 mg  - fluticasone (FLONASE) 50 MCG/ACT nasal spray; Administer 2 Sprays into affected nostril(S) every day.  Dispense: 9.9 mL; Refill: 0        MDM/Plan/Discussion  Patient was educated to not  skip any antibiotic doses and take the entire prescription even if they are feeling better.   After discussing the risk and benefits of a Kenalog injection the patient still did not request having this medication administered.  She will be administered with her yearly Kenalog shot today and was advised to follow-up with her PCP for further evaluation and treatment if her symptoms are not improving.  The patient was advised to begin using intranasal sprays for the next 2 weeks to help treat her symptoms.  She is agreeable to this plan and verbalizes understanding      Shared decision-making was utilized with patient for treatment plan. Medication discussed included indication for use and the potential benefits and side effects. Education was provided regarding the aforementioned assessments.  Differential Diagnosis, natural history, and supportive care discussed. All of the patient's questions were answered to their satisfaction at the time of discharge. Patient was encouraged to monitor symptoms closely. Those signs and symptoms which would warrant concern and mandate seeking a higher level of service through the emergency department discussed at length.  Patient stated agreement and understanding of this plan of care.    Advised the patient to follow-up with the primary care physician for recheck,  reevaluation, and consideration of further management.    Please note that this dictation was created using voice recognition software. I have made a reasonable attempt to correct obvious errors, but I expect that there are errors of grammar and possibly content that I did not discover before finalizing the note.    This note was electronically signed by WERNER Heller

## 2025-04-11 NOTE — LETTER
[facility]  [address]  [place] [address]     April 11, 2025    Patient: Chantel Adamson   YOB: 1965   Date of Visit: 4/11/2025       To Whom It May Concern:    Chantel Adamson was seen and treated in our department on 4/11/2025. Please excuse from work for 4/13 and 4/14.    Sincerely,     MILTON Heller.

## 2025-05-09 ENCOUNTER — HOSPITAL ENCOUNTER (OUTPATIENT)
Dept: RADIOLOGY | Facility: MEDICAL CENTER | Age: 60
End: 2025-05-09
Attending: STUDENT IN AN ORGANIZED HEALTH CARE EDUCATION/TRAINING PROGRAM
Payer: COMMERCIAL

## 2025-05-09 DIAGNOSIS — Z12.31 VISIT FOR SCREENING MAMMOGRAM: ICD-10-CM

## 2025-05-09 PROCEDURE — 77067 SCR MAMMO BI INCL CAD: CPT
